# Patient Record
Sex: MALE | Race: BLACK OR AFRICAN AMERICAN | Employment: OTHER | URBAN - METROPOLITAN AREA
[De-identification: names, ages, dates, MRNs, and addresses within clinical notes are randomized per-mention and may not be internally consistent; named-entity substitution may affect disease eponyms.]

---

## 2021-01-19 ENCOUNTER — HOSPITAL ENCOUNTER (OUTPATIENT)
Dept: WOUND CARE | Age: 71
Discharge: HOME | End: 2021-01-19
Admitting: SURGERY
Payer: MEDICARE

## 2021-01-19 VITALS
SYSTOLIC BLOOD PRESSURE: 130 MMHG | BODY MASS INDEX: 32.9 KG/M2 | RESPIRATION RATE: 20 BRPM | DIASTOLIC BLOOD PRESSURE: 70 MMHG | WEIGHT: 235 LBS | TEMPERATURE: 96.3 F | HEART RATE: 60 BPM | HEIGHT: 71 IN

## 2021-01-19 DIAGNOSIS — S91.301A OPEN WOUND OF RIGHT FOOT, INITIAL ENCOUNTER: Chronic | ICD-10-CM

## 2021-01-19 PROCEDURE — 99214 OFFICE O/P EST MOD 30 MIN: CPT | Performed by: SURGERY

## 2021-01-19 PROCEDURE — 99214 OFFICE O/P EST MOD 30 MIN: CPT

## 2021-01-19 RX ORDER — LOVASTATIN 40 MG/1
40 TABLET ORAL 2 TIMES DAILY
COMMUNITY

## 2021-01-19 RX ORDER — DIPHENHYDRAMINE HCL 50 MG
50 CAPSULE ORAL DAILY
COMMUNITY

## 2021-01-19 RX ORDER — FLUTICASONE PROPIONATE 50 MCG
1 SPRAY, SUSPENSION (ML) NASAL 2 TIMES DAILY PRN
COMMUNITY

## 2021-01-19 RX ORDER — CLOTRIMAZOLE AND BETAMETHASONE DIPROPIONATE 10; .64 MG/G; MG/G
CREAM TOPICAL 2 TIMES DAILY
COMMUNITY

## 2021-01-19 RX ORDER — HYDROCODONE BITARTRATE AND ACETAMINOPHEN 7.5; 325 MG/1; MG/1
1 TABLET ORAL EVERY 8 HOURS PRN
COMMUNITY

## 2021-01-19 RX ORDER — LIRAGLUTIDE 6 MG/ML
1.8 INJECTION SUBCUTANEOUS NIGHTLY
COMMUNITY

## 2021-01-19 RX ORDER — BRIMONIDINE TARTRATE, TIMOLOL MALEATE 2; 5 MG/ML; MG/ML
1 SOLUTION/ DROPS OPHTHALMIC 3 TIMES DAILY
COMMUNITY

## 2021-01-19 RX ORDER — CEPHALEXIN 500 MG/1
500 CAPSULE ORAL 4 TIMES DAILY
COMMUNITY
End: 2022-06-13

## 2021-01-19 RX ORDER — INSULIN LISPRO 100 [IU]/ML
INJECTION, SOLUTION INTRAVENOUS; SUBCUTANEOUS 3 TIMES DAILY
COMMUNITY

## 2021-01-19 RX ORDER — ATENOLOL 50 MG/1
50 TABLET ORAL DAILY
COMMUNITY

## 2021-01-19 RX ORDER — LATANOPROST 50 UG/ML
1 SOLUTION/ DROPS OPHTHALMIC NIGHTLY
COMMUNITY
End: 2022-09-25

## 2021-01-19 RX ORDER — DORZOLAMIDE HCL 20 MG/ML
2 SOLUTION/ DROPS OPHTHALMIC 3 TIMES DAILY
COMMUNITY

## 2021-01-19 RX ORDER — GABAPENTIN 400 MG/1
400 CAPSULE ORAL 3 TIMES DAILY
COMMUNITY

## 2021-01-26 ENCOUNTER — HOSPITAL ENCOUNTER (OUTPATIENT)
Dept: WOUND CARE | Age: 71
Discharge: HOME | End: 2021-01-26
Payer: MEDICARE

## 2021-01-26 PROBLEM — S91.301A OPEN WOUND OF RIGHT FOOT: Chronic | Status: ACTIVE | Noted: 2021-01-26
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
## 2022-04-03 ENCOUNTER — APPOINTMENT (OUTPATIENT)
Dept: CT IMAGING | Age: 72
End: 2022-04-03
Payer: MEDICARE

## 2022-04-03 ENCOUNTER — HOSPITAL ENCOUNTER (EMERGENCY)
Age: 72
Discharge: HOME | End: 2022-04-04
Attending: STUDENT IN AN ORGANIZED HEALTH CARE EDUCATION/TRAINING PROGRAM
Payer: MEDICARE

## 2022-04-03 VITALS
RESPIRATION RATE: 17 BRPM | SYSTOLIC BLOOD PRESSURE: 150 MMHG | TEMPERATURE: 98.3 F | BODY MASS INDEX: 33.5 KG/M2 | OXYGEN SATURATION: 98 % | HEIGHT: 70 IN | DIASTOLIC BLOOD PRESSURE: 87 MMHG | HEART RATE: 84 BPM | WEIGHT: 234 LBS

## 2022-04-03 DIAGNOSIS — B37.9 YEAST INFECTION: ICD-10-CM

## 2022-04-03 DIAGNOSIS — R10.30 LOWER ABDOMINAL PAIN: ICD-10-CM

## 2022-04-03 DIAGNOSIS — N13.30 HYDRONEPHROSIS, UNSPECIFIED HYDRONEPHROSIS TYPE: ICD-10-CM

## 2022-04-03 DIAGNOSIS — T83.010A SUPRAPUBIC CATHETER DYSFUNCTION, INITIAL ENCOUNTER (HCC): ICD-10-CM

## 2022-04-03 DIAGNOSIS — N30.01 ACUTE CYSTITIS WITH HEMATURIA: Primary | ICD-10-CM

## 2022-04-03 LAB
ALBUMIN SERPL-MCNC: 3.3 G/DL (ref 3.5–5.2)
ALP BLD-CCNC: 105 U/L (ref 40–129)
ALT SERPL-CCNC: 13 U/L (ref 0–40)
ANION GAP SERPL CALCULATED.3IONS-SCNC: 8 MMOL/L (ref 7–16)
AST SERPL-CCNC: 22 U/L (ref 0–39)
BACTERIA: ABNORMAL /HPF
BASOPHILS ABSOLUTE: 0.02 E9/L (ref 0–0.2)
BASOPHILS RELATIVE PERCENT: 0.3 % (ref 0–2)
BILIRUB SERPL-MCNC: 0.4 MG/DL (ref 0–1.2)
BILIRUBIN URINE: NEGATIVE
BLOOD, URINE: ABNORMAL
BUN BLDV-MCNC: 18 MG/DL (ref 6–23)
CALCIUM SERPL-MCNC: 9 MG/DL (ref 8.6–10.2)
CHLORIDE BLD-SCNC: 105 MMOL/L (ref 98–107)
CLARITY: ABNORMAL
CO2: 20 MMOL/L (ref 22–29)
COLOR: YELLOW
CREAT SERPL-MCNC: 1.1 MG/DL (ref 0.7–1.2)
EOSINOPHILS ABSOLUTE: 0.27 E9/L (ref 0.05–0.5)
EOSINOPHILS RELATIVE PERCENT: 4 % (ref 0–6)
GFR AFRICAN AMERICAN: >60
GFR NON-AFRICAN AMERICAN: >60 ML/MIN/1.73
GLUCOSE BLD-MCNC: 89 MG/DL (ref 74–99)
GLUCOSE URINE: NEGATIVE MG/DL
HCT VFR BLD CALC: 39.3 % (ref 37–54)
HEMOGLOBIN: 12.4 G/DL (ref 12.5–16.5)
IMMATURE GRANULOCYTES #: 0.03 E9/L
IMMATURE GRANULOCYTES %: 0.4 % (ref 0–5)
KETONES, URINE: NEGATIVE MG/DL
LEUKOCYTE ESTERASE, URINE: ABNORMAL
LIPASE: 52 U/L (ref 13–60)
LYMPHOCYTES ABSOLUTE: 2.46 E9/L (ref 1.5–4)
LYMPHOCYTES RELATIVE PERCENT: 36.8 % (ref 20–42)
MCH RBC QN AUTO: 27.7 PG (ref 26–35)
MCHC RBC AUTO-ENTMCNC: 31.6 % (ref 32–34.5)
MCV RBC AUTO: 87.9 FL (ref 80–99.9)
MONOCYTES ABSOLUTE: 0.81 E9/L (ref 0.1–0.95)
MONOCYTES RELATIVE PERCENT: 12.1 % (ref 2–12)
NEUTROPHILS ABSOLUTE: 3.1 E9/L (ref 1.8–7.3)
NEUTROPHILS RELATIVE PERCENT: 46.4 % (ref 43–80)
NITRITE, URINE: NEGATIVE
PDW BLD-RTO: 12.6 FL (ref 11.5–15)
PH UA: 6.5 (ref 5–9)
PLATELET # BLD: 215 E9/L (ref 130–450)
PMV BLD AUTO: 10.9 FL (ref 7–12)
POTASSIUM REFLEX MAGNESIUM: 4.7 MMOL/L (ref 3.5–5)
PROTEIN UA: 100 MG/DL
RBC # BLD: 4.47 E12/L (ref 3.8–5.8)
RBC UA: >20 /HPF (ref 0–2)
REASON FOR REJECTION: NORMAL
REJECTED TEST: NORMAL
SODIUM BLD-SCNC: 133 MMOL/L (ref 132–146)
SPECIFIC GRAVITY UA: 1.02 (ref 1–1.03)
TOTAL PROTEIN: 7.6 G/DL (ref 6.4–8.3)
UROBILINOGEN, URINE: 0.2 E.U./DL
WBC # BLD: 6.7 E9/L (ref 4.5–11.5)
WBC UA: ABNORMAL /HPF (ref 0–5)
YEAST: PRESENT /HPF

## 2022-04-03 PROCEDURE — 6370000000 HC RX 637 (ALT 250 FOR IP): Performed by: STUDENT IN AN ORGANIZED HEALTH CARE EDUCATION/TRAINING PROGRAM

## 2022-04-03 PROCEDURE — 6360000004 HC RX CONTRAST MEDICATION: Performed by: RADIOLOGY

## 2022-04-03 PROCEDURE — 99284 EMERGENCY DEPT VISIT MOD MDM: CPT

## 2022-04-03 PROCEDURE — 85025 COMPLETE CBC W/AUTO DIFF WBC: CPT

## 2022-04-03 PROCEDURE — 87186 SC STD MICRODIL/AGAR DIL: CPT

## 2022-04-03 PROCEDURE — 81001 URINALYSIS AUTO W/SCOPE: CPT

## 2022-04-03 PROCEDURE — 96374 THER/PROPH/DIAG INJ IV PUSH: CPT

## 2022-04-03 PROCEDURE — 80053 COMPREHEN METABOLIC PANEL: CPT

## 2022-04-03 PROCEDURE — 87088 URINE BACTERIA CULTURE: CPT

## 2022-04-03 PROCEDURE — 83690 ASSAY OF LIPASE: CPT

## 2022-04-03 PROCEDURE — 74177 CT ABD & PELVIS W/CONTRAST: CPT

## 2022-04-03 PROCEDURE — 6360000002 HC RX W HCPCS: Performed by: STUDENT IN AN ORGANIZED HEALTH CARE EDUCATION/TRAINING PROGRAM

## 2022-04-03 RX ORDER — CEFDINIR 300 MG/1
300 CAPSULE ORAL 2 TIMES DAILY
Qty: 14 CAPSULE | Refills: 0 | Status: SHIPPED | OUTPATIENT
Start: 2022-04-03 | End: 2022-04-10

## 2022-04-03 RX ORDER — CEFDINIR 300 MG/1
300 CAPSULE ORAL ONCE
Status: COMPLETED | OUTPATIENT
Start: 2022-04-03 | End: 2022-04-03

## 2022-04-03 RX ORDER — FLUCONAZOLE 100 MG/1
200 TABLET ORAL ONCE
Status: COMPLETED | OUTPATIENT
Start: 2022-04-04 | End: 2022-04-04

## 2022-04-03 RX ORDER — MORPHINE SULFATE 2 MG/ML
4 INJECTION, SOLUTION INTRAMUSCULAR; INTRAVENOUS ONCE
Status: COMPLETED | OUTPATIENT
Start: 2022-04-03 | End: 2022-04-03

## 2022-04-03 RX ADMIN — MORPHINE SULFATE 4 MG: 2 INJECTION, SOLUTION INTRAMUSCULAR; INTRAVENOUS at 18:35

## 2022-04-03 RX ADMIN — IOPAMIDOL 90 ML: 755 INJECTION, SOLUTION INTRAVENOUS at 21:00

## 2022-04-03 RX ADMIN — CEFDINIR 300 MG: 300 CAPSULE ORAL at 22:34

## 2022-04-03 ASSESSMENT — PAIN SCALES - WONG BAKER: WONGBAKER_NUMERICALRESPONSE: 4

## 2022-04-03 ASSESSMENT — PAIN DESCRIPTION - FREQUENCY: FREQUENCY: CONTINUOUS

## 2022-04-03 ASSESSMENT — PAIN DESCRIPTION - DESCRIPTORS: DESCRIPTORS: ACHING

## 2022-04-03 ASSESSMENT — ENCOUNTER SYMPTOMS
CONSTIPATION: 0
COLOR CHANGE: 0
NAUSEA: 0
SHORTNESS OF BREATH: 0
ABDOMINAL PAIN: 0
COUGH: 0
VOMITING: 0
DIARRHEA: 0

## 2022-04-03 ASSESSMENT — PAIN - FUNCTIONAL ASSESSMENT: PAIN_FUNCTIONAL_ASSESSMENT: 0-10

## 2022-04-03 ASSESSMENT — PAIN SCALES - GENERAL
PAINLEVEL_OUTOF10: 5
PAINLEVEL_OUTOF10: 8

## 2022-04-03 ASSESSMENT — PAIN DESCRIPTION - PAIN TYPE: TYPE: ACUTE PAIN

## 2022-04-03 ASSESSMENT — PAIN DESCRIPTION - LOCATION: LOCATION: GROIN

## 2022-04-03 NOTE — ED PROVIDER NOTES
Department of Emergency Medicine   ED  Provider Note  Admit Date/RoomTime: 4/3/2022  5:29 PM  ED Room: /          History of Present Illness:  4/3/22, Time: 6:22 PM EDT  Chief Complaint   Patient presents with    Urinary Catheter Problem     patient has suprapubic cath, he states that it is clogged. Deepali Yeboah is a 66 y.o. male presenting to the ED for suprapubic catheter malfunction. Patient presents today stating that his pubic catheter is plugged. He had it placed on March 8 at the [facility] clinic with Dr. Mesha Goodwin. It has not been exchanged since surgery. The patient states when he woke up this morning his Mcmahan catheter bag was full. He emptied it and had continued output but noted it to be decreased. At 7:30 AM, he has not had any output returned. He notes that he has been having malodorous urine. At times some penis urine drainage is noted. When this happens he does get some relief of his pain. He has associated suprapubic abdominal pain that radiates to the bilateral flanks. Its sharp. Nothing worsens it. Seems intermittent. Positive for dysuria, denies hematuria. Having normal bowel movements. No nausea or vomiting. No chest pain, shortness of breath or fevers. No history of trauma. Review of Systems:   Constitutional symptoms: Denies fever  Eyes: Denies eye pain  Ears, Nose, Mouth, Throat: Denies ear pain  Cardiovascular: Denies chest pain  Respiratory: Denies shortness of breath  Gastrointestinal: Denies blood per rectum. Positive for abdominal pain  Genitourinary: Positive for decreased urine output.   Positive for bilateral flank pain  Skin: Denies rashes  Neurological: Denies headache  Musculoskeletal: Denies extremity pain    --------------------------------------------- PAST HISTORY ---------------------------------------------  Past Medical History:  has a past medical history of Cancer ([facility] Diabetes mellitus ([facility] Glaucoma, Hyperlipidemia, and Hypertension. Past Surgical History:  has a past surgical history that includes Colonoscopy; eye surgery (2003); and joint replacement (2006). Social History:  reports that he has never smoked. He has never used smokeless tobacco. He reports previous alcohol use. He reports that he does not use drugs. Family History: family history includes Cancer in his father. . Unless otherwise noted, family history is non contributory    The patients home medications have been reviewed. Allergies: Tetracyclines & related, Zyloprim [allopurinol], and Sulfa antibiotics    I have reviewed the past medical history, past surgical history, social history, and family history    ---------------------------------------------------PHYSICAL EXAM--------------------------------------    General: The patient is conversational and lying comfortably in bed. Head: Normocephalic and atraumatic. Eyes: Sclera non-icteric and no conjunctival injection  ENT: Nasal and oral membranes moist  Neck: Trachea midline. No JVD  Respiratory: Lungs clear to auscultation bilaterally. Patient speaking in full sentences. Cardiovascular: Regular rate. No pedal edema. Gastrointestinal:  Abdomen is soft and nondistended. Bowel sounds present. Tenderness of the suprapubic region. . There is no guarding or rebound. Patient has a suprapubic catheter placed at the midline. There is 1 suture in place. No surrounding drainage. It is a 12 [employer] Jasmin catheter. No urine in the Mcmahan bag  Genitourinary: Patient has no penile drainage vesicles or tenderness. Testicles absent without tenderness of the scrotal sac. Negative CVA tenderness  Musculoskeletal: No deformity  Skin: Skin warm and dry. No rashes. Neurologic: No gross motor deficits. No aphasia.   Psychiatric: Normal affect.    -------------------------------------------------- RESULTS -------------------------------------------------  I have personally reviewed all laboratory and imaging results for this patient. Results are listed below.      LABS: (Lab results interpreted by me)  Results for orders placed or performed during the hospital encounter of 04/03/22   CBC with Auto Differential   Result Value Ref Range    WBC 6.7 4.5 - 11.5 E9/L    RBC 4.47 3.80 - 5.80 E12/L    Hemoglobin 12.4 (L) 12.5 - 16.5 g/dL    Hematocrit 39.3 37.0 - 54.0 %    MCV 87.9 80.0 - 99.9 fL    MCH 27.7 26.0 - 35.0 pg    MCHC 31.6 (L) 32.0 - 34.5 %    RDW 12.6 11.5 - 15.0 fL    Platelets 236 217 - 676 E9/L    MPV 10.9 7.0 - 12.0 fL    Neutrophils % 46.4 43.0 - 80.0 %    Immature Granulocytes % 0.4 0.0 - 5.0 %    Lymphocytes % 36.8 20.0 - 42.0 %    Monocytes % 12.1 (H) 2.0 - 12.0 %    Eosinophils % 4.0 0.0 - 6.0 %    Basophils % 0.3 0.0 - 2.0 %    Neutrophils Absolute 3.10 1.80 - 7.30 E9/L    Immature Granulocytes # 0.03 E9/L    Lymphocytes Absolute 2.46 1.50 - 4.00 E9/L    Monocytes Absolute 0.81 0.10 - 0.95 E9/L    Eosinophils Absolute 0.27 0.05 - 0.50 E9/L    Basophils Absolute 0.02 0.00 - 0.20 E9/L   Urinalysis with Microscopic   Result Value Ref Range    Color, UA Yellow Straw/Yellow    Clarity, UA CLOUDY (A) Clear    Glucose, Ur Negative Negative mg/dL    Bilirubin Urine Negative Negative    Ketones, Urine Negative Negative mg/dL    Specific Gravity, UA 1.020 1.005 - 1.030    Blood, Urine LARGE (A) Negative    pH, UA 6.5 5.0 - 9.0    Protein,  (A) Negative mg/dL    Urobilinogen, Urine 0.2 <2.0 E.U./dL    Nitrite, Urine Negative Negative    Leukocyte Esterase, Urine LARGE (A) Negative    WBC, UA PACKED (A) 0 - 5 /HPF    RBC, UA >20 0 - 2 /HPF    Bacteria, UA MODERATE (A) None Seen /HPF    Yeast, UA Present (A) None Seen /HPF   SPECIMEN REJECTION   Result Value Ref Range    Rejected Test cmpx lipas     Reason for Rejection see below    Comprehensive Metabolic Panel w/ Reflex to MG   Result Value Ref Range    Sodium 133 132 - 146 mmol/L    Potassium reflex Magnesium 4.7 3.5 - 5.0 mmol/L    Chloride 105 98 - 107 mmol/L CO2 20 (L) 22 - 29 mmol/L    Anion Gap 8 7 - 16 mmol/L    Glucose 89 74 - 99 mg/dL    BUN 18 6 - 23 mg/dL    CREATININE 1.1 0.7 - 1.2 mg/dL    GFR Non-African American >60 >=60 mL/min/1.73    GFR African American >60     Calcium 9.0 8.6 - 10.2 mg/dL    Total Protein 7.6 6.4 - 8.3 g/dL    Albumin 3.3 (L) 3.5 - 5.2 g/dL    Total Bilirubin 0.4 0.0 - 1.2 mg/dL    Alkaline Phosphatase 105 40 - 129 U/L    ALT 13 0 - 40 U/L    AST 22 0 - 39 U/L   Lipase   Result Value Ref Range    Lipase 52 13 - 60 U/L   ,     RADIOLOGY:  Interpreted by Radiologist unless otherwise specified  CT ABDOMEN PELVIS W IV CONTRAST Additional Contrast? None   Final Result   Severe bilateral hydro uretero nephrosis with nodular thickening of the   urinary bladder. Inflammatory process like cystitis is considered. Underlying malignancy is not excluded. Cystoscopy may be considered. Suprapubic catheter with  inflammatory changes along the tract concerning for   cellulitis. Upper normal appendix. Consider  surgical assessment. Nonspecific hypodense hepatic lesions. A 4 mm nodule in the right lower lobe   is also noted. Surveillance according to Fleischner society guidelines is   recommended. Thickening of the GE junction likely hiatal hernia. See above. Jayna Burt RECOMMENDATIONS:   Unavailable             ------------------------- NURSING NOTES AND VITALS REVIEWED ---------------------------   The nursing notes within the ED encounter and vital signs as below have been reviewed by myself  BP (!) 150/87   Pulse 84   Temp 98.3 °F (36.8 °C) (Oral)   Resp 17   Ht 5' 10\" (1.778 m)   Wt 234 lb (106.1 kg)   SpO2 98%   BMI 33.58 kg/m²     Oxygen Saturation Interpretation: Normal    The patients available past medical records and past encounters were reviewed.       ------------------------------ ED COURSE/MEDICAL DECISION MAKING----------------------  Medications   morphine (PF) injection 4 mg (4 mg IntraVENous Given 4/3/22 1835)   iopamidol (ISOVUE-370) 76 % injection 90 mL (90 mLs IntraVENous Given 4/3/22 2100)   cefdinir (OMNICEF) capsule 300 mg (300 mg Oral Given 4/3/22 2234)   fluconazole (DIFLUCAN) tablet 200 mg (200 mg Oral Given 4/4/22 0027)       Medical Decision Making: This is a 66 y.o. male presenting to the ED for suprapubic catheter malfunction. On initial presentation, the patient's vital signs are interpreted as hypertensive, afebrile and saturating well on room air. Based on history and physical exam, we have a broad differential.  Patient likely has a blocked suprapubic catheter. We will attempt to irrigate and move the obstruction. However as the patient is having abdominal pain with flank pain we cannot exclude pyelonephritis, urinary tract infection, or acute kidney injury. Abdominal exam soft and nonsurgical.  I will touch base with Dr. Goldy Gibbs as the patient has had history reviewed catheter for less than 6 weeks and his tract is likely not mature. He will be given morphine for pain control. Laboratory studies and CT abdomen pelvis obtained. Patient denies any systemic infectious symptoms at this time. Laboratory studies show no leukocytosis. And minimal anemia. Urinalysis shows yeast infection and evidence of urinary tract infection. Electrolytes show minimal acidosis but no anion gap. Hypoalbuminemia but LFTs otherwise within normal limits. CT abdomen pelvis shows severe bilateral hydroureteronephrosis with nodular thickening. Inflammatory process likely cystitis considered. Underlying malignancy cannot be excluded. Suprapubic catheter with inflammatory change. Upper normal appendix. Nonspecific hypodense hepatic lesion. GE junction showing hiatal hernia. This is interpreted by radiology. Patient's Mcmahan catheter is exchanged by nursing staff with Ryan. Large amount of urine is expressed and another 300 cc placed in the bag. Patient felt significant and immediate relief.     With the patient's questionable enlarged appendix, surgery is consulted. I do not suspect appendicitis as the patient's abdominal exam is soft and nonsurgical.    Surgery is cleared the patient for discharge. He is started on cefdinir and given a dose of fluconazole for the yeast infection. We have given strict return precautions. He is to follow with urology and primary care physician. Currently he feels back to baseline and expressed desire to be discharged. This patient's ED course included: a personal history and physicial examination and re-evaluation prior to disposition    This patient has improved during their ED course. Consultations:  Surgery, urology    Oxygen Saturation Interpretation: 98 % on room air. Normal    Counseling:   I have spoken with the patient and discussed todays results, in addition to providing specific details for the plan of care and counseling regarding the diagnosis and prognosis. Questions are answered at this time and they are agreeable with the plan.     --------------------------------- IMPRESSION AND DISPOSITION ---------------------------------    IMPRESSION  1. Acute cystitis with hematuria    2. Lower abdominal pain    3. Hydronephrosis, unspecified hydronephrosis type    4. Yeast infection    5. Suprapubic catheter dysfunction, [facility] ([facility]        DISPOSITION  Disposition: Discharge to home  Patient condition is fair    Dr. Xiang KENNEDY, am the primary provider of record    NOTE: This report was transcribed using voice recognition software.  Every effort was made to ensure accuracy; however, inadvertent computerized transcription errors may be present       Xiang Fritz MD  04/04/22 0028

## 2022-04-04 PROCEDURE — 6370000000 HC RX 637 (ALT 250 FOR IP): Performed by: STUDENT IN AN ORGANIZED HEALTH CARE EDUCATION/TRAINING PROGRAM

## 2022-04-04 RX ADMIN — FLUCONAZOLE 200 MG: 100 TABLET ORAL at 00:27

## 2022-04-04 NOTE — CONSULTS
GENERAL SURGERY  CONSULT NOTE  4/3/2022    Physician Consulted: Dr. Flako Jennings  Reason for Consult: appendix evaluation  Referring Physician: Dr. Rico PLATT  Becky Carballo is a 66 y.o. male who presents to the general surgery service for evaluation of his appendix. The patient is currently presenting for suprapubic catheter malfunction. He has no abdominal complaints at this time with the exception of some discomfort and drainage around his catheter site. He has been tolerating a regular diet without issues. No nausea, fevers, or chills. He is moving his bowels. No prior abdominal surgeries. CT imaging was read as borderline enlargement of appendix. Past Medical History:   Diagnosis Date    Cancer [facility]) 2018    prostrate    Diabetes mellitus ([facility] [address]     Glaucoma     Hyperlipidemia     Hypertension        Past Surgical History:   Procedure Laterality Date    COLONOSCOPY      EYE SURGERY  2003    glass eye(contact) right eye    JOINT REPLACEMENT  2006    right shoulder surgery       Medications Prior to Admission:    Prior to Admission medications    Medication Sig Start Date End Date Taking? Authorizing Provider   clotrimazole-betamethasone (LOTRISONE) 1-0.05 % cream Apply topically 2 times daily Apply topically 2 times daily. Historical Provider, MD   cephALEXin (KEFLEX) 500 MG capsule Take 500 mg by mouth 4 times daily    Historical Provider, MD   fluticasone (FLONASE) 50 MCG/ACT nasal spray 1 spray by Each Nostril route 2 times daily as needed for Rhinitis    Historical Provider, MD   atenolol (TENORMIN) 50 MG tablet Take 50 mg by mouth daily    Historical Provider, MD   lovastatin (MEVACOR) 40 MG tablet Take 40 mg by mouth 2 times daily    Historical Provider, MD   gabapentin (NEURONTIN) 400 MG capsule Take 400 mg by mouth 3 times daily.     Historical Provider, MD   diphenhydrAMINE (BANOPHEN) 50 MG capsule Take 50 mg by mouth daily    Historical Provider, MD   brimonidine-timolol (COMBIGAN) 0.2-0.5 % ophthalmic solution Place 1 drop into the left eye 3 times daily    Historical Provider, MD   dorzolamide (TRUSOPT) 2 % ophthalmic solution Place 2 drops into the left eye 3 times daily    Historical Provider, MD   latanoprost (XALATAN) 0.005 % ophthalmic solution Place 1 drop into the left eye nightly    Historical Provider, MD   neomycin-polymyxin-dexamethasone (MAXITROL) 0.1 % ophthalmic suspension Place 1 drop into the right eye daily    Historical Provider, MD   insulin lispro, 1 Unit Dial, (HUMALOG KWIKPEN) 100 UNIT/ML SOPN Inject into the skin 3 times daily 0-7 units sliding scale    Historical Provider, MD   Insulin Glargine (TOUJEO SOLOSTAR SC) Inject 25 Units into the skin nightly    Historical Provider, MD   Liraglutide (VICTOZA) 18 MG/3ML SOPN SC injection Inject 1.8 mg into the skin nightly    Historical Provider, MD   HYDROcodone-acetaminophen (NORCO) 7.5-325 MG per tablet Take 1 tablet by mouth every 8 hours as needed for Pain. Historical Provider, MD       Allergies   Allergen Reactions    Tetracyclines & Related Swelling    Zyloprim [Allopurinol] Hives    Sulfa Antibiotics Rash       Family History   Problem Relation Age of Onset    Cancer Father        Social History     Tobacco Use    Smoking status: Never Smoker    Smokeless tobacco: Never Used   Vaping Use    Vaping Use: Never used   Substance Use Topics    Alcohol use: Not Currently    Drug use: Never         Review of Systems   Constitutional: Negative for appetite change, chills and fever. Respiratory: Negative for cough and shortness of breath. Gastrointestinal: Negative for abdominal pain, constipation, diarrhea, nausea and vomiting. Genitourinary: Positive for decreased urine volume and dysuria. Skin: Negative for color change and wound. Neurological: Negative for dizziness and weakness.          PHYSICAL EXAM:    Vitals:    04/03/22 1628   BP: (!) 150/87   Pulse: 84   Resp: 17   Temp:    SpO2: 98%       General Appearance:  awake, alert, oriented, in no acute distress  Skin:  Skin color, texture, turgor normal. No rashes or lesions. Head:  NCAT. No scleral icterus or conjunctival pallor  Lungs/Chest:  Normal expansion. No chest wall tenderness  Cardiovascular:  Warm throughout. No chest pain  Abdomen:  Soft, non tender, non distended. No RLQ or LLQ tenderness to deep palpation. No guarding. Suprapubic catheter in place with minimal clear surrounding drainage  Extremities: Extremities warm to touch      LABS:    CBC  Recent Labs     04/03/22  1821   WBC 6.7   HGB 12.4*   HCT 39.3        BMP  Recent Labs     04/03/22 1951      K 4.7      CO2 20*   BUN 18   CREATININE 1.1   CALCIUM 9.0     Liver Function  Recent Labs     04/03/22 1951   LIPASE 52   BILITOT 0.4   AST 22   ALT 13   ALKPHOS 105   PROT 7.6   LABALBU 3.3*     No results for input(s): LACTATE in the last 72 hours. No results for input(s): INR, PTT in the last 72 hours. Invalid input(s): PT    RADIOLOGY    CT ABDOMEN PELVIS W IV CONTRAST Additional Contrast? None    Result Date: 4/3/2022  EXAMINATION: CT OF THE ABDOMEN AND PELVIS WITH CONTRAST 4/3/2022 8:58 pm TECHNIQUE: CT of the abdomen and pelvis was performed with the administration of intravenous contrast. Multiplanar reformatted images are provided for review. Dose modulation, iterative reconstruction, and/or weight based adjustment of the mA/kV was utilized to reduce the radiation dose to as low as reasonably achievable. COMPARISON: None. HISTORY: ORDERING SYSTEM PROVIDED HISTORY: abd pain, recent suprapubic placement TECHNOLOGIST PROVIDED HISTORY: Additional Contrast?->None Reason for exam:->abd pain, recent suprapubic placement Decision Support Exception - unselect if not a suspected or confirmed emergency medical condition->Emergency Medical Condition (MA) What reading provider will be dictating this exam?->CRC FINDINGS: The lung bases demonstrate 4 mm nodule on the right side. Liver is fatty with nonspecific hypodense lesions in the right hepatic lobe measuring up to 1.2 cm. Consider surveillance. A small hiatal hernia is noted. If the patient has difficulty swallowing, endoscopic assessment is indicated. Gallbladder is distended. Consider ultrasonography. Spleen, pancreas, and the right adrenal gland appear normal.  There is a 1.3 cm left adrenal nodule. There is severe bilateral hydro nephrosis and hydro ureters with perinephric stranding. There is stranding along the ureters more on the left side. There is calcification aorta and degenerative changes in the lumbosacral spine. Pelvis. The urinary bladder is partially contracted with diffuse nodular thickening measuring up to 1.2 cm. A suprapubic catheter is identified with the inflammatory stranding densities along the tract. There is diverticulosis of the colon which is collapsed with mild mural thickening. The appendix is upper normal.  A fat containing umbilical hernia is noted. Severe bilateral hydro uretero nephrosis with nodular thickening of the urinary bladder. Inflammatory process like cystitis is considered. Underlying malignancy is not excluded. Cystoscopy may be considered. Suprapubic catheter with  inflammatory changes along the tract concerning for cellulitis. Upper normal appendix. Consider  surgical assessment. Nonspecific hypodense hepatic lesions. A 4 mm nodule in the right lower lobe is also noted. Surveillance according to Fleischner society guidelines is recommended. Thickening of the GE junction likely hiatal hernia. See above. Kamila Quinn RECOMMENDATIONS: Unavailable       I have personally reviewed all relevant labs and imaging. ASSESSMENT:  66 y.o. male with normal appearing appendix on CT imaging. No surrounding inflammation and air is seen within the appendiceal lumen.  Currently presenting of suprapubic catheter malfunction    PLAN:  - no general surgical intervention indicated at this time  - no concern for acute appendicitis at this time, antibiotics not needed from general surgery standpoint  - diet as tolerated from general surgery standpoint    Electronically signed by Brenton Fischer DO on 4/3/22 at 11:19 PM EDT

## 2022-04-06 LAB
ORGANISM: ABNORMAL
URINE CULTURE, ROUTINE: ABNORMAL

## 2022-06-13 ENCOUNTER — HOSPITAL ENCOUNTER (EMERGENCY)
Age: 72
Discharge: HOME | End: 2022-06-13
Attending: EMERGENCY MEDICINE
Payer: MEDICARE

## 2022-06-13 VITALS
WEIGHT: 234 LBS | TEMPERATURE: 97.8 F | HEIGHT: 70 IN | HEART RATE: 102 BPM | OXYGEN SATURATION: 100 % | SYSTOLIC BLOOD PRESSURE: 153 MMHG | BODY MASS INDEX: 33.5 KG/M2 | DIASTOLIC BLOOD PRESSURE: 85 MMHG | RESPIRATION RATE: 16 BRPM

## 2022-06-13 DIAGNOSIS — N39.0 URINARY TRACT INFECTION ASSOCIATED WITH CATHETERIZATION OF URINARY TRACT, UNSPECIFIED INDWELLING URINARY CATHETER TYPE, INITIAL ENCOUNTER (HCC): Primary | ICD-10-CM

## 2022-06-13 DIAGNOSIS — T83.010A SUPRAPUBIC CATHETER DYSFUNCTION, INITIAL ENCOUNTER (HCC): ICD-10-CM

## 2022-06-13 DIAGNOSIS — T83.511A URINARY TRACT INFECTION ASSOCIATED WITH CATHETERIZATION OF URINARY TRACT, UNSPECIFIED INDWELLING URINARY CATHETER TYPE, INITIAL ENCOUNTER (HCC): Primary | ICD-10-CM

## 2022-06-13 LAB
ANION GAP SERPL CALCULATED.3IONS-SCNC: 13 MMOL/L (ref 7–16)
BACTERIA: ABNORMAL /HPF
BILIRUBIN URINE: NEGATIVE
BLOOD, URINE: ABNORMAL
BUN BLDV-MCNC: 18 MG/DL (ref 6–23)
CALCIUM SERPL-MCNC: 9.2 MG/DL (ref 8.6–10.2)
CHLORIDE BLD-SCNC: 105 MMOL/L (ref 98–107)
CLARITY: ABNORMAL
CO2: 19 MMOL/L (ref 22–29)
COLOR: YELLOW
CREAT SERPL-MCNC: 1.2 MG/DL (ref 0.7–1.2)
GFR AFRICAN AMERICAN: >60
GFR NON-AFRICAN AMERICAN: >60 ML/MIN/1.73
GLUCOSE BLD-MCNC: 109 MG/DL (ref 74–99)
GLUCOSE URINE: NEGATIVE MG/DL
KETONES, URINE: NEGATIVE MG/DL
LEUKOCYTE ESTERASE, URINE: ABNORMAL
NITRITE, URINE: POSITIVE
PH UA: 6 (ref 5–9)
POTASSIUM SERPL-SCNC: 4 MMOL/L (ref 3.5–5)
PROTEIN UA: 30 MG/DL
RBC UA: ABNORMAL /HPF (ref 0–2)
SODIUM BLD-SCNC: 137 MMOL/L (ref 132–146)
SPECIFIC GRAVITY UA: 1.02 (ref 1–1.03)
UROBILINOGEN, URINE: 0.2 E.U./DL
WBC UA: ABNORMAL /HPF (ref 0–5)

## 2022-06-13 PROCEDURE — 81001 URINALYSIS AUTO W/SCOPE: CPT

## 2022-06-13 PROCEDURE — 6370000000 HC RX 637 (ALT 250 FOR IP): Performed by: EMERGENCY MEDICINE

## 2022-06-13 PROCEDURE — 99283 EMERGENCY DEPT VISIT LOW MDM: CPT

## 2022-06-13 PROCEDURE — 80048 BASIC METABOLIC PNL TOTAL CA: CPT

## 2022-06-13 PROCEDURE — 51798 US URINE CAPACITY MEASURE: CPT

## 2022-06-13 RX ORDER — CEFDINIR 300 MG/1
300 CAPSULE ORAL ONCE
Status: COMPLETED | OUTPATIENT
Start: 2022-06-13 | End: 2022-06-13

## 2022-06-13 RX ORDER — CEFDINIR 300 MG/1
300 CAPSULE ORAL 2 TIMES DAILY
Qty: 20 CAPSULE | Refills: 0 | Status: SHIPPED | OUTPATIENT
Start: 2022-06-13 | End: 2022-06-23

## 2022-06-13 RX ADMIN — CEFDINIR 300 MG: 300 CAPSULE ORAL at 23:08

## 2022-06-13 ASSESSMENT — ENCOUNTER SYMPTOMS
NAUSEA: 0
VOMITING: 0
SHORTNESS OF BREATH: 0
EYE REDNESS: 0
ABDOMINAL PAIN: 1

## 2022-06-13 ASSESSMENT — PAIN DESCRIPTION - LOCATION: LOCATION: OTHER (COMMENT)

## 2022-06-13 ASSESSMENT — PAIN DESCRIPTION - DESCRIPTORS: DESCRIPTORS: DISCOMFORT;PRESSURE

## 2022-06-13 ASSESSMENT — PAIN - FUNCTIONAL ASSESSMENT
PAIN_FUNCTIONAL_ASSESSMENT: 0-10
PAIN_FUNCTIONAL_ASSESSMENT: PREVENTS OR INTERFERES SOME ACTIVE ACTIVITIES AND ADLS

## 2022-06-13 ASSESSMENT — PAIN SCALES - GENERAL: PAINLEVEL_OUTOF10: 8

## 2022-06-14 NOTE — ED PROVIDER NOTES
Chief complaint: Clogged suprapubic catheter      HPI:  6/13/22, Time: 10:13 PM EDT    HPI               Luke Bone is a 67 y.o. male presenting to the ED for clogged suprapubic catheter. The patient states that his catheter has been clogged for approximately 24 hours. He has had very decreased urination and is now having urine go from his penis. He does have a suprapubic pressure. This is moderate in severity. Nothing makes it better. Worsened by his catheter being clogged. He has had a clogged catheter in the past which was able to be unclogged. He is scheduled to go to his urologist Dr. Sharon Boss for replacement of the catheter in the next few days. He denies any fevers, chills, chest pain, shortness of breath, diarrhea or constipation. ROS:   Review of Systems   Constitutional: Negative for chills and fever. HENT: Negative for congestion. Eyes: Negative for redness. Respiratory: Negative for shortness of breath. Cardiovascular: Negative for chest pain. Gastrointestinal: Positive for abdominal pain. Negative for nausea and vomiting. Genitourinary:        Pubic catheter clogged   Musculoskeletal: Negative for arthralgias. Skin: Negative for rash. Neurological: Negative for light-headedness. Psychiatric/Behavioral: Negative for confusion. All other systems reviewed and are negative.      --------------------------------------------- PAST HISTORY ---------------------------------------------  Past Medical History:  has a past medical history of Cancer ([facility] [address] Diabetes mellitus ([facility] [address] Glaucoma, Hyperlipidemia, and Hypertension. Past Surgical History:  has a past surgical history that includes Colonoscopy; eye surgery (2003); and joint replacement (2006). Social History:  reports that he has never smoked. He has never used smokeless tobacco. He reports previous alcohol use. He reports that he does not use drugs. Family History: family history includes Cancer in his father. The patients home medications have been reviewed. Allergies: Tetracyclines & related, Zyloprim [allopurinol], and Sulfa antibiotics    ---------------------------------------------------PHYSICAL EXAM--------------------------------------      Constitutional/General: Alert and oriented x3, well appearing, non toxic in NAD  Head: Normocephalic and atraumatic  Mouth: Oropharynx clear, handling secretions, no trismus  Neck: Supple, full ROM,  Pulmonary: Lungs clear to auscultation bilaterally, no wheezes, rales, or rhonchi. Not in respiratory distress  Cardiovascular:  Regular rate. Regular rhythm. No murmurs  Chest: no chest wall tenderness  Abdomen: Soft. Non tender. Non distended. No rebound, guarding, or rigidity. No pulsatile masses appreciated. Suprapubic catheter no erythema or warmth, 16 [place] catheter in place  Musculoskeletal: Moves all extremities x 4. Warm and well perfused, no clubbing, cyanosis, or edema. Capillary refill <3 seconds  Skin: warm and dry. No rashes. Neurologic: GCS 15, no gross focal neurologic deficits  Psych: Normal Affect    -------------------------------------------------- RESULTS -------------------------------------------------  I have personally reviewed all laboratory and imaging results for this patient. Results are listed below.      LABS:  Results for orders placed or performed during the hospital encounter of 88/05/86   Basic Metabolic Panel   Result Value Ref Range    Sodium 137 132 - 146 mmol/L    Potassium 4.0 3.5 - 5.0 mmol/L    Chloride 105 98 - 107 mmol/L    CO2 19 (L) 22 - 29 mmol/L    Anion Gap 13 7 - 16 mmol/L    Glucose 109 (H) 74 - 99 mg/dL    BUN 18 6 - 23 mg/dL    CREATININE 1.2 0.7 - 1.2 mg/dL    GFR Non-African American >60 >=60 mL/min/1.73    GFR African American >60     Calcium 9.2 8.6 - 10.2 mg/dL   Urinalysis with Microscopic   Result Value Ref Range    Color, UA Yellow Straw/Yellow    Clarity, UA CLOUDY (A) Clear    Glucose, Ur Negative Negative mg/dL    Bilirubin Urine Negative Negative    Ketones, Urine Negative Negative mg/dL    Specific Gravity, UA 1.020 1.005 - 1.030    Blood, Urine LARGE (A) Negative    pH, UA 6.0 5.0 - 9.0    Protein, UA 30 (A) Negative mg/dL    Urobilinogen, Urine 0.2 <2.0 E.U./dL    Nitrite, Urine POSITIVE (A) Negative    Leukocyte Esterase, Urine LARGE (A) Negative    WBC, UA PACKED (A) 0 - 5 /HPF    RBC, UA 10-20 (A) 0 - 2 /HPF    Bacteria, UA MANY (A) None Seen /HPF       RADIOLOGY:  Interpreted by Radiologist.  No orders to display           ------------------------- NURSING NOTES AND VITALS REVIEWED ---------------------------   The nursing notes within the ED encounter and vital signs as below have been reviewed by myself. BP (!) 153/85   Pulse (!) 102   Temp 97.8 °F (36.6 °C) (Oral)   Resp 16   Ht 5' 10\" (1.778 m)   Wt 234 lb (106.1 kg)   SpO2 100%   BMI 33.58 kg/m²   Oxygen Saturation Interpretation: Normal    The patients available past medical records and past encounters were reviewed. ------------------------------ ED COURSE/MEDICAL DECISION MAKING----------------------  Medications   cefdinir (OMNICEF) capsule 300 mg (300 mg Oral Given 6/13/22 4808)       Bladder Catheterization Procedure Note    Indication: urinary retention    Procedure: The patient's abdominal wall area was prepped with betadine and draped in a sterile fashion. A 16 Arabic suprapubic catheter was inserted into the bladder using sterile technique. The catheter returned greater than 300 cc of slightly yellow urine. The patient tolerated the procedure well. Complications: None            Medical Decision Making:   IDr. Andrei am the primary physician of record. Rachael Zamudio is a 67 y.o. male who presents to the ED for clogged suprapubic catheter. Patient did have catheter replaced in the emergency department. Patient will have urinalysis for urinary tract infection. BMP unremarkable.   Patient be discharged on Omnicef. Re-Evaluations/Consultations:             ED Course as of 06/13/22 7711   Mon Jun 13, 2022 2220 The patient is in the bed no acute distress. Results of today discussed. Patient will be discharged. The patient states he feels significantly better. [MT]      ED Course User Index  [MT] Marcelina Moser DO               This patient's ED course included: History, physical examination, reevaluation prior to disposition, labs, catheter replacement    This patient has remained hemodynamically stable during their ED course. Counseling: The emergency provider has spoken with the patient and discussed todays results, in addition to providing specific details for the plan of care and counseling regarding the diagnosis and prognosis. Questions are answered at this time and they are agreeable with the plan.       --------------------------------- IMPRESSION AND DISPOSITION ---------------------------------    IMPRESSION  1. Urinary tract infection associated with catheterization of urinary tract, unspecified indwelling urinary catheter type, initial encounter ([facility] [address]    2. [facility] catheter dysfunction[facility]        DISPOSITION  Disposition: Discharge to home  Patient condition is stable        NOTE: This report was transcribed using voice recognition software. Every effort was made to ensure accuracy; however, inadvertent computerized transcription errors may be present         Marcelina Moser DO  06/13/22 2230     ATTENDING PROVIDER ATTESTATION:     I,  Dr. Byron Gomez am the primary physician of record for this patient. Christine Harmon presented to the emergency department for evaluation of Urinary Catheter Problem (has a suprapubic catheter in thats clogged. )   and was initially evaluated by the Medical Resident. See Original ED Note for H&P and ED course above.      I have reviewed and discussed the case, including pertinent history (medical, surgical, family and social) and exam

## 2022-08-12 ENCOUNTER — HOSPITAL ENCOUNTER (EMERGENCY)
Age: 72
Discharge: HOME | End: 2022-08-12
Attending: EMERGENCY MEDICINE
Payer: MEDICARE

## 2022-08-12 VITALS
RESPIRATION RATE: 16 BRPM | HEART RATE: 74 BPM | SYSTOLIC BLOOD PRESSURE: 134 MMHG | TEMPERATURE: 98.8 F | BODY MASS INDEX: 33.64 KG/M2 | HEIGHT: 70 IN | DIASTOLIC BLOOD PRESSURE: 76 MMHG | OXYGEN SATURATION: 98 % | WEIGHT: 235 LBS

## 2022-08-12 DIAGNOSIS — T83.010A SUPRAPUBIC CATHETER DYSFUNCTION, INITIAL ENCOUNTER (HCC): Primary | ICD-10-CM

## 2022-08-12 LAB
ALBUMIN SERPL-MCNC: 3.9 G/DL (ref 3.5–5.2)
ALP BLD-CCNC: 117 U/L (ref 40–129)
ALT SERPL-CCNC: 15 U/L (ref 0–40)
ANION GAP SERPL CALCULATED.3IONS-SCNC: 11 MMOL/L (ref 7–16)
AST SERPL-CCNC: 19 U/L (ref 0–39)
BACTERIA: ABNORMAL /HPF
BASOPHILS ABSOLUTE: 0.03 E9/L (ref 0–0.2)
BASOPHILS RELATIVE PERCENT: 0.5 % (ref 0–2)
BILIRUB SERPL-MCNC: 0.4 MG/DL (ref 0–1.2)
BILIRUBIN URINE: NEGATIVE
BLOOD, URINE: ABNORMAL
BUN BLDV-MCNC: 18 MG/DL (ref 6–23)
CALCIUM SERPL-MCNC: 9.8 MG/DL (ref 8.6–10.2)
CHLORIDE BLD-SCNC: 105 MMOL/L (ref 98–107)
CLARITY: ABNORMAL
CO2: 22 MMOL/L (ref 22–29)
COLOR: YELLOW
CREAT SERPL-MCNC: 1.2 MG/DL (ref 0.7–1.2)
EOSINOPHILS ABSOLUTE: 0.19 E9/L (ref 0.05–0.5)
EOSINOPHILS RELATIVE PERCENT: 2.9 % (ref 0–6)
GFR AFRICAN AMERICAN: >60
GFR NON-AFRICAN AMERICAN: >60 ML/MIN/1.73
GLUCOSE BLD-MCNC: 153 MG/DL (ref 74–99)
GLUCOSE URINE: NEGATIVE MG/DL
HCT VFR BLD CALC: 41.1 % (ref 37–54)
HEMOGLOBIN: 13.1 G/DL (ref 12.5–16.5)
IMMATURE GRANULOCYTES #: 0.02 E9/L
IMMATURE GRANULOCYTES %: 0.3 % (ref 0–5)
KETONES, URINE: NEGATIVE MG/DL
LACTIC ACID: 1.2 MMOL/L (ref 0.5–2.2)
LEUKOCYTE ESTERASE, URINE: ABNORMAL
LYMPHOCYTES ABSOLUTE: 2.89 E9/L (ref 1.5–4)
LYMPHOCYTES RELATIVE PERCENT: 43.7 % (ref 20–42)
MCH RBC QN AUTO: 28 PG (ref 26–35)
MCHC RBC AUTO-ENTMCNC: 31.9 % (ref 32–34.5)
MCV RBC AUTO: 87.8 FL (ref 80–99.9)
MONOCYTES ABSOLUTE: 0.61 E9/L (ref 0.1–0.95)
MONOCYTES RELATIVE PERCENT: 9.2 % (ref 2–12)
NEUTROPHILS ABSOLUTE: 2.88 E9/L (ref 1.8–7.3)
NEUTROPHILS RELATIVE PERCENT: 43.4 % (ref 43–80)
NITRITE, URINE: NEGATIVE
PDW BLD-RTO: 13.5 FL (ref 11.5–15)
PH UA: 6 (ref 5–9)
PLATELET # BLD: 220 E9/L (ref 130–450)
PMV BLD AUTO: 10.7 FL (ref 7–12)
POTASSIUM REFLEX MAGNESIUM: 5 MMOL/L (ref 3.5–5)
PROTEIN UA: 30 MG/DL
RBC # BLD: 4.68 E12/L (ref 3.8–5.8)
RBC UA: ABNORMAL /HPF (ref 0–2)
SODIUM BLD-SCNC: 138 MMOL/L (ref 132–146)
SPECIFIC GRAVITY UA: 1.01 (ref 1–1.03)
TOTAL PROTEIN: 8.3 G/DL (ref 6.4–8.3)
UROBILINOGEN, URINE: 0.2 E.U./DL
WBC # BLD: 6.6 E9/L (ref 4.5–11.5)
WBC UA: ABNORMAL /HPF (ref 0–5)

## 2022-08-12 PROCEDURE — 99283 EMERGENCY DEPT VISIT LOW MDM: CPT

## 2022-08-12 PROCEDURE — 80053 COMPREHEN METABOLIC PANEL: CPT

## 2022-08-12 PROCEDURE — 85025 COMPLETE CBC W/AUTO DIFF WBC: CPT

## 2022-08-12 PROCEDURE — 87088 URINE BACTERIA CULTURE: CPT

## 2022-08-12 PROCEDURE — 81001 URINALYSIS AUTO W/SCOPE: CPT

## 2022-08-12 PROCEDURE — 87186 SC STD MICRODIL/AGAR DIL: CPT

## 2022-08-12 PROCEDURE — 83605 ASSAY OF LACTIC ACID: CPT

## 2022-08-12 ASSESSMENT — PAIN SCALES - GENERAL: PAINLEVEL_OUTOF10: 10

## 2022-08-12 ASSESSMENT — PAIN - FUNCTIONAL ASSESSMENT: PAIN_FUNCTIONAL_ASSESSMENT: 0-10

## 2022-08-12 ASSESSMENT — PAIN DESCRIPTION - FREQUENCY: FREQUENCY: CONTINUOUS

## 2022-08-12 ASSESSMENT — PAIN DESCRIPTION - PAIN TYPE: TYPE: ACUTE PAIN

## 2022-08-12 ASSESSMENT — PAIN DESCRIPTION - LOCATION: LOCATION: ABDOMEN

## 2022-08-12 NOTE — ED PROVIDER NOTES
Viviana Grey is a 67 y.o. male    Chief Complaint   Patient presents with    Urinary Catheter     Sp cath not draining          HPI   Viviana Grey is a 67 y.o. male presenting to the ED for Urinary Catheter (Sp cath not draining )    History comes primarily from the patient. Presents to the emergency department for concerns of suprapubic catheter dysfunction. Patient is complaining of suprapubic pain as well as bilateral CVA pain since yesterday. He is only had approximately 200 cc of cloudy urine out into his catheter since yesterday in the last 24 hours. He did try flushing it without improvement. He has had significant amount of leakage through his penis which has not been an issue for the past several months. Review of Systems   Constitutional:  Negative for appetite change, fatigue and fever. HENT:  Negative for congestion, rhinorrhea and trouble swallowing. Eyes:  Negative for redness and itching. Respiratory:  Negative for chest tightness, shortness of breath and wheezing. Cardiovascular:  Negative for chest pain, palpitations and leg swelling. Gastrointestinal:  Positive for abdominal pain. Negative for abdominal distention, constipation, diarrhea, nausea and vomiting. Genitourinary:  Positive for decreased urine volume and flank pain. Negative for difficulty urinating and frequency. Musculoskeletal:  Negative for arthralgias, back pain and myalgias. Neurological:  Negative for dizziness, syncope, weakness, numbness and headaches. Psychiatric/Behavioral:  Negative for agitation, behavioral problems, confusion and decreased concentration. The patient is not nervous/anxious. All other systems reviewed and are negative. Physical Exam  Vitals reviewed. Constitutional:       General: He is not in acute distress. Appearance: Normal appearance. He is not ill-appearing. HENT:      Head: Normocephalic and atraumatic.       Right Ear: External ear normal.      Left Ear: External ear normal.      Nose: Nose normal. No rhinorrhea. Mouth/Throat:      Mouth: Mucous membranes are moist.      Pharynx: Oropharynx is clear. Eyes:      Extraocular Movements: Extraocular movements intact. Conjunctiva/sclera: Conjunctivae normal.      Pupils: Pupils are equal, round, and reactive to light. Cardiovascular:      Rate and Rhythm: Normal rate and regular rhythm. Heart sounds: Normal heart sounds. No murmur heard. Pulmonary:      Effort: Pulmonary effort is normal. No respiratory distress. Breath sounds: Normal breath sounds. Abdominal:      General: Abdomen is flat and protuberant. There is no distension. Tenderness: There is abdominal tenderness in the suprapubic area. There is right CVA tenderness and left CVA tenderness. Comments: Suprapubic catheter appears in place   Musculoskeletal:         General: No swelling or tenderness. Normal range of motion. Cervical back: Normal range of motion. No rigidity or tenderness. Skin:     General: Skin is warm and dry. Findings: No rash. Neurological:      General: No focal deficit present. Mental Status: He is alert and oriented to person, place, and time. Cranial Nerves: No cranial nerve deficit. Motor: No weakness. Psychiatric:         Mood and Affect: Mood normal.         Behavior: Behavior normal.        Procedures   LIMITED ABDOMINAL BEDSIDE ULTRASOUND     Risks, benefits and alternatives (for applicable procedures below) described. Performed By: Ludivina Fuentes MD.    Indication:  Pain. Informed consent: by patient or legal gardian. Procedural Quadrants:     Attempted to visualize bladder via US but was unable to see the bladder. Suprapubic catheter replaced by myself with return of urinary flow.          MDM     Amount and/or Complexity of Data Reviewed  Decide to obtain previous medical records or to obtain history from someone other than the patient: yes       Patient presented to the Emergency Department for Urinary Catheter (Sp cath not draining )    Suprapubic catheter replaced. Previous catheter balloon appeared to only be inflated to about 4cc. After replacement of suprapubic catheter, return of urine flow. By the time patient leaves approximately 7 to 800 cc of urine have returned into the bag. Patient states his pain is completely resolved at this time and he will be discharged home to follow-up with his urologist.           --------------------------------------------- PAST HISTORY ---------------------------------------------  Past Medical History:  has a past medical history of Cancer ([facility] [address] Diabetes mellitus ([facility] [address] Glaucoma, Hyperlipidemia, and Hypertension. Past Surgical History:  has a past surgical history that includes Colonoscopy; eye surgery (2003); and joint replacement (2006). Social History:  reports that he has never smoked. He has never used smokeless tobacco. He reports that he does not currently use alcohol. He reports that he does not use drugs. Family History: family history includes Cancer in his father. The patients home medications have been reviewed.     Allergies: Tetracyclines & related, Zyloprim [allopurinol], and Sulfa antibiotics    -------------------------------------------------- RESULTS -------------------------------------------------  Labs:  Results for orders placed or performed during the hospital encounter of 08/12/22   Culture, Urine    Specimen: Urine, clean catch   Result Value Ref Range    Organism Proteus mirabilis (A)     Urine Culture, Routine 75,000 CFU/ml  Sensitivity to follow      CBC with Auto Differential   Result Value Ref Range    WBC 6.6 4.5 - 11.5 E9/L    RBC 4.68 3.80 - 5.80 E12/L    Hemoglobin 13.1 12.5 - 16.5 g/dL    Hematocrit 41.1 37.0 - 54.0 %    MCV 87.8 80.0 - 99.9 fL    MCH 28.0 26.0 - 35.0 pg    MCHC 31.9 (L) 32.0 - 34.5 %    RDW 13.5 11.5 - 15.0 fL    Platelets 003 804 - 766 E9/L    MPV 10.7 7.0 - 12.0 fL    Neutrophils % 43.4 43.0 - 80.0 %    Immature Granulocytes % 0.3 0.0 - 5.0 %    Lymphocytes % 43.7 (H) 20.0 - 42.0 %    Monocytes % 9.2 2.0 - 12.0 %    Eosinophils % 2.9 0.0 - 6.0 %    Basophils % 0.5 0.0 - 2.0 %    Neutrophils Absolute 2.88 1.80 - 7.30 E9/L    Immature Granulocytes # 0.02 E9/L    Lymphocytes Absolute 2.89 1.50 - 4.00 E9/L    Monocytes Absolute 0.61 0.10 - 0.95 E9/L    Eosinophils Absolute 0.19 0.05 - 0.50 E9/L    Basophils Absolute 0.03 0.00 - 0.20 E9/L   Comprehensive Metabolic Panel w/ Reflex to MG   Result Value Ref Range    Sodium 138 132 - 146 mmol/L    Potassium reflex Magnesium 5.0 3.5 - 5.0 mmol/L    Chloride 105 98 - 107 mmol/L    CO2 22 22 - 29 mmol/L    Anion Gap 11 7 - 16 mmol/L    Glucose 153 (H) 74 - 99 mg/dL    BUN 18 6 - 23 mg/dL    Creatinine 1.2 0.7 - 1.2 mg/dL    GFR Non-African American >60 >=60 mL/min/1.73    GFR African American >60     Calcium 9.8 8.6 - 10.2 mg/dL    Total Protein 8.3 6.4 - 8.3 g/dL    Albumin 3.9 3.5 - 5.2 g/dL    Total Bilirubin 0.4 0.0 - 1.2 mg/dL    Alkaline Phosphatase 117 40 - 129 U/L    ALT 15 0 - 40 U/L    AST 19 0 - 39 U/L   Urinalysis with Microscopic   Result Value Ref Range    Color, UA Yellow Straw/Yellow    Clarity, UA TURBID (A) Clear    Glucose, Ur Negative Negative mg/dL    Bilirubin Urine Negative Negative    Ketones, Urine Negative Negative mg/dL    Specific Gravity, UA 1.015 1.005 - 1.030    Blood, Urine LARGE (A) Negative    pH, UA 6.0 5.0 - 9.0    Protein, UA 30 (A) Negative mg/dL    Urobilinogen, Urine 0.2 <2.0 E.U./dL    Nitrite, Urine Negative Negative    Leukocyte Esterase, Urine LARGE (A) Negative    WBC, UA PACKED (A) 0 - 5 /HPF    RBC, UA 5-10 (A) 0 - 2 /HPF    Bacteria, UA FEW (A) None Seen /HPF   Lactic Acid   Result Value Ref Range    Lactic Acid 1.2 0.5 - 2.2 mmol/L       Radiology:  No orders to display       ------------------------- NURSING NOTES AND VITALS REVIEWED ---------------------------  Date / Time Roomed:  8/12/2022 12:06 PM  ED Bed Assignment:  37/37    The nursing notes within the ED encounter and vital signs as below have been reviewed. /76   Pulse 74   Temp 98.8 °F (37.1 °C) (Oral)   Resp 16   Ht 5' 10\" (1.778 m)   Wt 235 lb (106.6 kg)   SpO2 98%   BMI 33.72 kg/m²   Oxygen Saturation Interpretation: Normal      ------------------------------------------ PROGRESS NOTES ------------------------------------------  1:37 AM EDT  I have spoken with the patient and discussed todays results, in addition to providing specific details for the plan of care and counseling regarding the diagnosis and prognosis. Their questions are answered at this time and they are agreeable with the plan. I discussed at length with them reasons for immediate return here for re evaluation. They will followup with their  Urologist and primary care physician by calling their office on Monday.      --------------------------------- ADDITIONAL PROVIDER NOTES ---------------------------------  At this time the patient is without objective evidence of an acute process requiring hospitalization or inpatient management. They have remained hemodynamically stable throughout their entire ED visit and are stable for discharge with outpatient follow-up. The plan has been discussed in detail and they are aware of the specific conditions for emergent return, as well as the importance of follow-up. Discharge Medication List as of 8/12/2022  4:32 PM          Diagnosis:  1. Suprapubic catheter dysfunction, initial encounter [facility])        Disposition:  Patient's disposition: Discharge to home  Patient's condition is stable. Strict return precautions were discussed including but not limited too new or worsening symtpoms. They verbalized understanding and were agreeable with the plan. All questions were answered and patient was discharged.        Ranjan Rico MD  Resident  08/14/22 8254 Statement Selected

## 2022-08-13 ASSESSMENT — ENCOUNTER SYMPTOMS
ABDOMINAL PAIN: 1
TROUBLE SWALLOWING: 0
NAUSEA: 0
DIARRHEA: 0
WHEEZING: 0
ABDOMINAL DISTENTION: 0
EYE ITCHING: 0
RHINORRHEA: 0
SHORTNESS OF BREATH: 0
EYE REDNESS: 0
CHEST TIGHTNESS: 0
BACK PAIN: 0
CONSTIPATION: 0
VOMITING: 0

## 2022-08-14 LAB
ORGANISM: ABNORMAL
URINE CULTURE, ROUTINE: ABNORMAL

## 2022-09-25 ENCOUNTER — HOSPITAL ENCOUNTER (EMERGENCY)
Age: 72
Discharge: HOME | End: 2022-09-25
Attending: EMERGENCY MEDICINE
Payer: MEDICARE

## 2022-09-25 VITALS
RESPIRATION RATE: 20 BRPM | BODY MASS INDEX: 35.22 KG/M2 | HEIGHT: 70 IN | OXYGEN SATURATION: 98 % | WEIGHT: 246 LBS | SYSTOLIC BLOOD PRESSURE: 152 MMHG | DIASTOLIC BLOOD PRESSURE: 81 MMHG | TEMPERATURE: 98.4 F | HEART RATE: 75 BPM

## 2022-09-25 DIAGNOSIS — T83.090A BLOCKED SUPRAPUBIC CATHETER, INITIAL ENCOUNTER (HCC): Primary | ICD-10-CM

## 2022-09-25 PROCEDURE — 99282 EMERGENCY DEPT VISIT SF MDM: CPT

## 2022-09-25 ASSESSMENT — PAIN - FUNCTIONAL ASSESSMENT: PAIN_FUNCTIONAL_ASSESSMENT: 0-10

## 2022-09-25 ASSESSMENT — PAIN DESCRIPTION - PAIN TYPE: TYPE: ACUTE PAIN

## 2022-09-25 ASSESSMENT — PAIN DESCRIPTION - DESCRIPTORS: DESCRIPTORS: SHARP;PRESSURE

## 2022-09-25 ASSESSMENT — PAIN DESCRIPTION - FREQUENCY: FREQUENCY: CONTINUOUS

## 2022-09-25 ASSESSMENT — PAIN DESCRIPTION - LOCATION: LOCATION: ABDOMEN

## 2022-09-25 ASSESSMENT — PAIN DESCRIPTION - ONSET: ONSET: SUDDEN

## 2022-09-25 ASSESSMENT — PAIN SCALES - GENERAL: PAINLEVEL_OUTOF10: 8

## 2022-09-25 NOTE — ED PROVIDER NOTES
HPI:  9/25/22, Time: 1:30 PM EDT         Dominique Thakkar is a 67 y.o. male presenting to the ED for blocked suprapubic cather, beginning yesterday. He is now leaking urine out of his urethra. He would like it changed today. He is to have the catheter removed permanently and a urostomy placed on 10/18/22. The complaint has been persistent, moderate in severity, and worsened by nothing. He is asymptomatic otherwise. Patient denies fever/chills, sore throat, cough, congestion, chest pain, shortness of breath, edema, headache, visual disturbances, focal paresthesias, focal weakness, abdominal pain, nausea, vomiting, diarrhea, constipation, dysuria, hematuria, trauma, neck or back pain, rash or other complaints. ROS:   A complete review of systems was performed and all pertinent positives and negatives are stated within HPI, all other systems reviewed and are negative.      --------------------------------------------- PAST HISTORY ---------------------------------------------  Past Medical History:  has a past medical history of Cancer ([facility] [address] [facility] Glaucoma, Hyperlipidemia, and Hypertension. Past Surgical History:  has a past surgical history that includes Colonoscopy; eye surgery (2003); and joint replacement (2006). Social History:  reports that he has never smoked. He has never used smokeless tobacco. He reports that he does not currently use alcohol. He reports that he does not use drugs. Family History: family history includes Cancer in his father. The patients home medications have been reviewed.     Allergies: Tetracyclines & related, Zyloprim [allopurinol], and Sulfa antibiotics        ----------------------------------------PHYSICAL EXAM--------------------------------------  Constitutional:  Well developed, well nourished, no acute distress, non-toxic appearance   Eyes:  PERRL, conjunctiva normal, EOMI  HENT:  Atraumatic, external ears normal, nose normal, oropharynx moist. Neck- normal range of motion, no nuchal rigidity   Respiratory:  No respiratory distress, normal breath sounds, no rales, no wheezing   Cardiovascular:  Normal rate, normal rhythm, no murmurs, no gallops, no rubs. Radial and DP pulses 2+ bilaterally. Compartments are soft. He is warm and well perfused. Cap refill less than 3 seconds. GI:  Soft, nondistended, normal bowel sounds, nontender, no rebound, no guarding   :  No costovertebral angle tenderness. Suprapubic catheter in place, [address], that is completely blocked. Urine is overflowing from the bladder out of his urethral meatus. Musculoskeletal:  No edema, no deformities. Integument:  Well hydrated, no rash. Adequate perfusion. Neurologic:  Alert & oriented x 3, CN 2-12 normal, no focal deficits noted. Normal gait. Psychiatric:  Speech and behavior appropriate       -------------------------------------------------- RESULTS -------------------------------------------------  I have personally reviewed all laboratory and imaging results for this patient. Results are listed below. LABS:  No results found for this visit on 09/25/22. RADIOLOGY:  Interpreted by Radiologist.  No orders to display     ------------------------- NURSING NOTES AND VITALS REVIEWED ---------------------------  The nursing notes within the ED encounter and vital signs as below have been reviewed by myself. BP (!) 152/81   Pulse 75   Temp 98.4 °F (36.9 °C) (Oral)   Resp 20   Ht 5' 10\" (1.778 m)   Wt 246 lb (111.6 kg)   SpO2 98%   BMI 35.30 kg/m²   Oxygen Saturation Interpretation: Normal      The patients available past medical records and past encounters were reviewed.         ------------------------------ ED COURSE/MEDICAL DECISION MAKING----------------------  Medications - No data to display        Procedures:   PROCEDURE  9/25/22       Time: 14:00 PM EDT    GONZALEZ CATHETER INSERTION  Risks, benefits and alternatives (for applicable procedures below) described. Performed By: Darrell Iqbal DO. Indication: replacement of suprapubic rosado. Informed consent: Verbal consent obtained. The patient was counseled regarding the procedure in person, it's indications, risks, potential complications and alternatives and any questions were answered. Verbal consent was obtained. Local Anesthesia:  not required/indicated. Procedure:  A 16F sized Rosado catheter was passed with success and clear yellow urine was sen in catheter tube and bag. The catheter was left in place. Patient tolerated the procedure well. Complications:  None. Urology Consult Placed:  No.           Medical Decision Making:    F/u with urology    This patient's ED course included: re-evaluation prior to disposition and a personal history and physicial eaxmination    This patient has remained hemodynamically stable and improved during their ED course. Re-Evaluations:  Time: 14:15 PM EDT   Re-evaluation. Patients symptoms are improving  Repeat physical examination is improved      Consultations:   none    Critical Care: none    IXiang DO, am the Primary Provider of Record    Counseling: The emergency provider has spoken with the patient and spouse/SO and discussed todays results, in addition to providing specific details for the plan of care and counseling regarding the diagnosis and prognosis. Questions are answered at this time and they are agreeable with the plan.    --------------------------- IMPRESSION AND DISPOSITION ---------------------------------    IMPRESSION  1.  Blocked suprapubic catheter, initial encounter [facility])        DISPOSITION  Disposition: Discharge to home  Patient condition is stable              Darrell Iqbal DO  09/25/22 1099